# Patient Record
Sex: MALE | Race: BLACK OR AFRICAN AMERICAN | NOT HISPANIC OR LATINO | URBAN - METROPOLITAN AREA
[De-identification: names, ages, dates, MRNs, and addresses within clinical notes are randomized per-mention and may not be internally consistent; named-entity substitution may affect disease eponyms.]

---
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
## 2023-12-19 NOTE — ED PEDIATRIC TRIAGE NOTE - SPO2 (%)
Please send next set of red vials begin at 0.5 mL    Red  0.5 ml  0.6 ml  0.8 ml  1 ml  1 ml  1 ml  1 ml  1 ml  1 ml*   1 ml  1 ml    *Order new vial    Javid Latif MD     96

## 2024-03-10 PROBLEM — Z71.82 EXERCISE COUNSELING: Status: ACTIVE | Noted: 2021-04-09

## 2025-07-09 NOTE — ED PEDIATRIC NURSE NOTE - CAS EDN DISCHARGE ASSESSMENT
Caller: Nancy Hernandez    Relationship: Mother    Best call back number: [phone]     What form or medical record are you requesting: VACCINATION RECORD    Who is requesting this form or medical record from you: MOTHER OF PATIENT    How would you like to receive the form or medical records (pick-up, mail, fax):     Timeframe paperwork needed: 07/10/25       Alert and oriented to person, place and time